# Patient Record
Sex: MALE | Race: WHITE | Employment: UNEMPLOYED | URBAN - METROPOLITAN AREA
[De-identification: names, ages, dates, MRNs, and addresses within clinical notes are randomized per-mention and may not be internally consistent; named-entity substitution may affect disease eponyms.]

---

## 2021-01-14 ENCOUNTER — HOSPITAL ENCOUNTER (EMERGENCY)
Age: 36
Discharge: HOME | End: 2021-01-15
Attending: STUDENT IN AN ORGANIZED HEALTH CARE EDUCATION/TRAINING PROGRAM
Payer: COMMERCIAL

## 2021-01-14 DIAGNOSIS — F29 PSYCHOSIS, UNSPECIFIED PSYCHOSIS TYPE (HCC): Primary | ICD-10-CM

## 2021-01-14 LAB
A/G RATIO: 1.5 (ref 1.1–2.2)
ACETAMINOPHEN LEVEL: <5 UG/ML (ref 10–30)
ALBUMIN SERPL-MCNC: 4.6 G/DL (ref 3.4–5)
ALP BLD-CCNC: 69 U/L (ref 40–129)
ALT SERPL-CCNC: 34 U/L (ref 10–40)
ANION GAP SERPL CALCULATED.3IONS-SCNC: 15 MMOL/L (ref 3–16)
AST SERPL-CCNC: 42 U/L (ref 15–37)
BASOPHILS ABSOLUTE: 0.1 K/UL (ref 0–0.2)
BASOPHILS RELATIVE PERCENT: 1 %
BILIRUB SERPL-MCNC: 1.1 MG/DL (ref 0–1)
BUN BLDV-MCNC: 37 MG/DL (ref 7–20)
CALCIUM SERPL-MCNC: 10.2 MG/DL (ref 8.3–10.6)
CHLORIDE BLD-SCNC: 101 MMOL/L (ref 99–110)
CO2: 21 MMOL/L (ref 21–32)
CREAT SERPL-MCNC: 0.9 MG/DL (ref 0.9–1.3)
EOSINOPHILS ABSOLUTE: 0.1 K/UL (ref 0–0.6)
EOSINOPHILS RELATIVE PERCENT: 0.5 %
ETHANOL: NORMAL MG/DL (ref 0–0.08)
GFR AFRICAN AMERICAN: >60
GFR NON-AFRICAN AMERICAN: >60
GLOBULIN: 3.1 G/DL
GLUCOSE BLD-MCNC: 140 MG/DL (ref 70–99)
HCT VFR BLD CALC: 45.8 % (ref 40.5–52.5)
HEMOGLOBIN: 15.7 G/DL (ref 13.5–17.5)
LYMPHOCYTES ABSOLUTE: 3.2 K/UL (ref 1–5.1)
LYMPHOCYTES RELATIVE PERCENT: 26.9 %
MCH RBC QN AUTO: 30.5 PG (ref 26–34)
MCHC RBC AUTO-ENTMCNC: 34.3 G/DL (ref 31–36)
MCV RBC AUTO: 88.8 FL (ref 80–100)
MONOCYTES ABSOLUTE: 1 K/UL (ref 0–1.3)
MONOCYTES RELATIVE PERCENT: 8.1 %
NEUTROPHILS ABSOLUTE: 7.5 K/UL (ref 1.7–7.7)
NEUTROPHILS RELATIVE PERCENT: 63.5 %
PDW BLD-RTO: 13.3 % (ref 12.4–15.4)
PLATELET # BLD: 238 K/UL (ref 135–450)
PMV BLD AUTO: 9.9 FL (ref 5–10.5)
POTASSIUM REFLEX MAGNESIUM: 4.1 MMOL/L (ref 3.5–5.1)
RBC # BLD: 5.16 M/UL (ref 4.2–5.9)
SALICYLATE, SERUM: <0.3 MG/DL (ref 15–30)
SARS-COV-2, NAAT: NOT DETECTED
SODIUM BLD-SCNC: 137 MMOL/L (ref 136–145)
TOTAL CK: 715 U/L (ref 39–308)
TOTAL PROTEIN: 7.7 G/DL (ref 6.4–8.2)
WBC # BLD: 11.9 K/UL (ref 4–11)

## 2021-01-14 PROCEDURE — G0480 DRUG TEST DEF 1-7 CLASSES: HCPCS

## 2021-01-14 PROCEDURE — 85025 COMPLETE CBC W/AUTO DIFF WBC: CPT

## 2021-01-14 PROCEDURE — 80053 COMPREHEN METABOLIC PANEL: CPT

## 2021-01-14 PROCEDURE — U0002 COVID-19 LAB TEST NON-CDC: HCPCS

## 2021-01-14 PROCEDURE — 82550 ASSAY OF CK (CPK): CPT

## 2021-01-14 PROCEDURE — 2500000003 HC RX 250 WO HCPCS: Performed by: EMERGENCY MEDICINE

## 2021-01-14 PROCEDURE — 99284 EMERGENCY DEPT VISIT MOD MDM: CPT

## 2021-01-14 PROCEDURE — 96372 THER/PROPH/DIAG INJ SC/IM: CPT

## 2021-01-14 RX ORDER — CHLORPROMAZINE HYDROCHLORIDE 25 MG/ML
50 INJECTION INTRAMUSCULAR ONCE
Status: COMPLETED | OUTPATIENT
Start: 2021-01-14 | End: 2021-01-14

## 2021-01-14 RX ADMIN — CHLORPROMAZINE HYDROCHLORIDE 50 MG: 25 INJECTION INTRAMUSCULAR at 22:21

## 2021-01-15 VITALS
OXYGEN SATURATION: 96 % | TEMPERATURE: 97 F | RESPIRATION RATE: 16 BRPM | HEART RATE: 112 BPM | BODY MASS INDEX: 22.4 KG/M2 | SYSTOLIC BLOOD PRESSURE: 105 MMHG | DIASTOLIC BLOOD PRESSURE: 65 MMHG | HEIGHT: 71 IN | WEIGHT: 160 LBS

## 2021-01-15 LAB
AMPHETAMINE SCREEN, URINE: POSITIVE
BARBITURATE SCREEN URINE: ABNORMAL
BENZODIAZEPINE SCREEN, URINE: ABNORMAL
BILIRUBIN URINE: NEGATIVE
BLOOD, URINE: NEGATIVE
CANNABINOID SCREEN URINE: POSITIVE
CLARITY: CLEAR
COCAINE METABOLITE SCREEN URINE: ABNORMAL
COLOR: YELLOW
GLUCOSE URINE: NEGATIVE MG/DL
KETONES, URINE: ABNORMAL MG/DL
LEUKOCYTE ESTERASE, URINE: NEGATIVE
Lab: ABNORMAL
METHADONE SCREEN, URINE: ABNORMAL
MICROSCOPIC EXAMINATION: ABNORMAL
NITRITE, URINE: NEGATIVE
OPIATE SCREEN URINE: ABNORMAL
OXYCODONE URINE: ABNORMAL
PH UA: 5.5
PH UA: 5.5 (ref 5–8)
PHENCYCLIDINE SCREEN URINE: ABNORMAL
PROPOXYPHENE SCREEN: ABNORMAL
PROTEIN UA: NEGATIVE MG/DL
SPECIFIC GRAVITY UA: >=1.03 (ref 1–1.03)
URINE TYPE: ABNORMAL
UROBILINOGEN, URINE: 0.2 E.U./DL

## 2021-01-15 PROCEDURE — 80307 DRUG TEST PRSMV CHEM ANLYZR: CPT

## 2021-01-15 PROCEDURE — 81003 URINALYSIS AUTO W/O SCOPE: CPT

## 2021-01-15 NOTE — ED PROVIDER NOTES
[address] ED      CHIEF COMPLAINT  Psychiatric Evaluation (Pt comes in from [facility] by police on statement of belief. PT was caught stealing from [employer] gas station , when police picked him up he starting making statements that he is sent from [employer] and he has all the answers. He told police that he may kill himself or other people but now says that police misunderstood. Pt was seen at [facility] last night for evaluation and discharged. )       [facility]  Zeny Acosta is a 28 y.o. male with a past medical history of cellulitis who presents to the ED for psychiatric evaluation. The police were called when the patient was caught stealing from a gas station. When police picked the patient up, he was making statements that he was sent from [employer] and had all the answers. He did endorse some suicidal ideation homicidal ideation to the police, but now states the police misunderstood. Patient was agitated on arrival, patient did receive Thorazine. Patient is unable to provide history. He was heard to smoke comments about potentially hurting other people. He does mention an area in his head. He is not able to focus and provide specific answers for questions. Patient has tangential speech with intermittent growling and grunting. He did state to nursing that he believes he is God. Suicidal ideation: denies  Previous attempts: denies  Homicidal ideation: unclear  Access to firearms: denies  Audiovisual hallucinations: Does appear the patient is responding to internal stimuli  Psychiatric medications: Unknown  Tobacco use: Denies  Alcohol use: Denies  Illicit drug use: Denies    Somatic complaints: Denies    No other complaints, modifying factors or associated symptoms. I have reviewed the following from the nursing documentation.     Past Medical History:   Diagnosis Date    MRSA (methicillin resistant staph aureus) culture positive 8/8/12    cellulitis leg History reviewed. No pertinent surgical history. Family History   Problem Relation Age of Onset    Arthritis Mother    [de-identified] / [place] Mother     Depression Sister     Mental Illness Paternal Uncle     High Blood Pressure Paternal Grandmother      Social History     Socioeconomic History    Marital status:      Spouse name: Not on file    Number of children: Not on file    Years of education: Not on file    Highest education level: Not on file   Occupational History    Not on file   Social Needs    Financial resource strain: Not on file    Food insecurity     Worry: Not on file     Inability: Not on file   [employer] needs     Medical: Not on file     Non-medical: Not on file   Tobacco Use    Smoking status: Current Every Day Smoker     Packs/day: 1.00     Types: Cigarettes    Smokeless tobacco: Never Used   Substance and Sexual Activity    Alcohol use: Not Currently     Comment: less than once a month    Drug use: No    Sexual activity: Yes     Partners: Female   Lifestyle    Physical activity     Days per week: Not on file     Minutes per session: Not on file    Stress: Not on file   Relationships    Social connections     Talks on phone: Not on file     Gets together: Not on file     Attends Church service: Not on file     Active member of club or organization: Not on file     Attends meetings of clubs or organizations: Not on file     Relationship status: Not on file    Intimate partner violence     Fear of current or ex partner: Not on file     Emotionally abused: Not on file     Physically abused: Not on file     Forced sexual activity: Not on file   Other Topics Concern    Not on file   Social History Narrative    Not on file     No current facility-administered medications for this encounter.       Current Outpatient Medications   Medication Sig Dispense Refill    naproxen (NAPROSYN) 500 MG tablet Take 1 tablet by mouth 2 times daily for 10 days 20 tablet 0    HYDROcodone-acetaminophen (NORCO) 5-325 MG per tablet Take 1 tablet by mouth every 4 hours as needed for Pain 15 tablet 0    ibuprofen (IBU) 800 MG tablet Take 1 tablet by mouth every 8 hours as needed for Pain. 20 tablet 0     No Known Allergies    REVIEW OF SYSTEMS  10 systems reviewed, pertinent positives per HPI otherwise noted to be negative. PHYSICAL EXAM  BP (!) 141/89   Pulse 104   Temp 97.8 °F (36.6 °C) (Axillary)   Resp 18   Ht 5' 11\" (1.803 m)   Wt 160 lb (72.6 kg)   SpO2 95%   BMI 22.32 kg/m²    GENERAL APPEARANCE: Awake and alert. Cooperative. no distress. HENT: Normocephalic. Atraumatic. Mucous membranes are moist  NECK: Supple. Full range of motion of the neck without stiffness or pain. EYES: PERRL. EOM's grossly intact. HEART/CHEST: RRR. No murmurs. LUNGS: Respirations unlabored. CTAB. Good air exchange. Speaking comfortably in full sentences. ABDOMEN: No tenderness. Soft. Non-distended. No masses. No organomegaly. No guarding or rebound. MUSCULOSKELETAL: No extremity edema. Compartments soft. No deformity. No tenderness in the extremities. All extremities neurovascularly intact. SKIN: Warm and dry. No acute rashes. NEUROLOGICAL: Alert and oriented. No gross facial drooping. Strength 5/5, sensation intact. PSYCHIATRIC: Does appear to be responding to internal stimuli. He is unable to directly answer questions about suicidal ideation or homicidal ideation. Does talk about an alien inside of his head. LABS  I have reviewed all labs for this visit.    Results for orders placed or performed during the hospital encounter of 01/14/21   CBC Auto Differential   Result Value Ref Range    WBC 11.9 (H) 4.0 - 11.0 K/uL    RBC 5.16 4.20 - 5.90 M/uL    Hemoglobin 15.7 13.5 - 17.5 g/dL    Hematocrit 45.8 40.5 - 52.5 %    MCV 88.8 80.0 - 100.0 fL    MCH 30.5 26.0 - 34.0 pg    MCHC 34.3 31.0 - 36.0 g/dL    RDW 13.3 12.4 - 15.4 %    Platelets 393 966 - 882 K/uL    MPV 9.9 5.0 - 10.5 fL    Neutrophils % 63.5 %    Lymphocytes % 26.9 %    Monocytes % 8.1 %    Eosinophils % 0.5 %    Basophils % 1.0 %    Neutrophils Absolute 7.5 1.7 - 7.7 K/uL    Lymphocytes Absolute 3.2 1.0 - 5.1 K/uL    Monocytes Absolute 1.0 0.0 - 1.3 K/uL    Eosinophils Absolute 0.1 0.0 - 0.6 K/uL    Basophils Absolute 0.1 0.0 - 0.2 K/uL   Comprehensive Metabolic Panel w/ Reflex to MG   Result Value Ref Range    Sodium 137 136 - 145 mmol/L    Potassium reflex Magnesium 4.1 3.5 - 5.1 mmol/L    Chloride 101 99 - 110 mmol/L    CO2 21 21 - 32 mmol/L    Anion Gap 15 3 - 16    Glucose 140 (H) 70 - 99 mg/dL    BUN 37 (H) 7 - 20 mg/dL    CREATININE 0.9 0.9 - 1.3 mg/dL    GFR Non-African American >60 >60    GFR African American >60 >60    Calcium 10.2 8.3 - 10.6 mg/dL    Total Protein 7.7 6.4 - 8.2 g/dL    Alb 4.6 3.4 - 5.0 g/dL    Albumin/Globulin Ratio 1.5 1.1 - 2.2    Total Bilirubin 1.1 (H) 0.0 - 1.0 mg/dL    Alkaline Phosphatase 69 40 - 129 U/L    ALT 34 10 - 40 U/L    AST 42 (H) 15 - 37 U/L    Globulin 3.1 g/dL   Acetaminophen level   Result Value Ref Range    Acetaminophen Level <5 (L) 10 - 30 ug/mL   Salicylate   Result Value Ref Range    Salicylate, Serum <3.8 (L) 15.0 - 30.0 mg/dL   Ethanol   Result Value Ref Range    Ethanol Lvl None Detected mg/dL   COVID-19   Result Value Ref Range    SARS-CoV-2, NAAT Not Detected Not Detected   CK   Result Value Ref Range    Total  (H) 39 - 308 U/L       During the patient's ED course, the patient was given:  Medications   chlorproMAZINE (THORAZINE) injection 50 mg (50 mg Intramuscular Given 1/14/21 2221)        ED COURSE/MDM  Patient seen and evaluated. Old records reviewed. Labs and imaging reviewed and results discussed with patient. Overall, patient in no acute distress, he appears to be in acute psychosis. He is tangential and difficult to redirect. Did receive Thorazine ordered by another provider upon presentation. He denies any somatic complaints.   Physical

## 2021-01-15 NOTE — ED NOTES
Patient awoken. VS obtained. Urine obtained and sent to lab.       Apolonia Cutler RN  01/15/21 5534

## 2021-01-15 NOTE — ED NOTES
Patient VS obtained. Patient awoken. Patient still appears somewhat high on drugs. Norbert Wesley attempting to evaluate patient.       Laron Peralta RN  01/15/21 4068

## 2021-01-15 NOTE — BH NOTE
Pt resting quietly in assigned treatment room, eyes closed, restless/frequently repositioning self. No signs of distress observed.

## 2021-01-15 NOTE — ED NOTES
Writer in to speak with patient and to obtain VS's. Patient cooperative with VS's, however, stated he was tired and wanted to sleep longer. He is irritable being awakened. Denies any current distress. Continues to be monitored for safety.      Teresita Mcclain RN  01/15/21 8879

## 2021-01-15 NOTE — BH NOTE
Patient continues to state that he is unwilling/unable to urinate to provide urine specimen. Is irritable due to being awakened to have VS taken. Patient is currently diaphoretic, is laying beneath 3 blankets. Denies any ill feeling or distress. Afebrile. Monitored for safety. Patient readily turned over and returned to resting state once VS's obtained.

## 2021-01-15 NOTE — ED NOTES
Patient continues to sleep. Will continue to monitor patient.       Apolonia Cutler RN  01/15/21 4777

## 2021-01-15 NOTE — BH NOTE
Patient is now resting quietly. Denies distress when asked if he is doing ok. Continues to be monitored for safety. Resps easy and even.

## 2021-01-15 NOTE — ED PROVIDER NOTES
I ordered medications for sedation, the patient was standing beside the bed, not redirectable verbally. He did require sedating medications for safety of himself and staff.      [facility] PHYSICAL [facility]ATION, [place]  01/14/21 0245

## 2021-01-15 NOTE — ED NOTES
Presenting Problem: pt was brought in by police on a SOB for a psychiatric evaluation. Appearance/Hygiene:  hospital attire   Motor Behavior: decreased   Attitude: cooperative at times but then during assessment pt reported that he was not answering any more questions and he had nothing else to talk about. Pt went back to sleep. Affect: anxiety, agitation  Speech: loud  Mood: anxious, irritable  Thought Processes: tangential speech  Perceptions: URIEL. Pt stopped answering questions  Thought content:  Illogical at times  Suicidal ideation:  no specific plan to harm self : pt denies being suicidal  Homicidal ideation: pt denies  Orientation: A&Ox4   Memory: impaired   Concentration: Poor    Insight/ judgement: poor insight and poor judgemment      Psychosocial and contextual factors: pt reported that he is homeless and he lives in [address] [place] [address]. C-SSRS Summary (including current and past suicidal ideation, plan, intent, and attempts) : pt is denying current SI and denied making suicidal statements when he was brought in.     Psychiatric History: pt denies    Patient reported diagnosis: none    Outpatient services/ Provider: none    Previous Inpatient Admissions( including location and dates if known): none    Self-injurious/ Self-harm behavior: URIEL    History of violence: URIEL    Current Substance use: pt's drug screen positive for amphetamines and marujuana    Trauma identified: URIEL    Access to Firearms: URIEL    ASSESSMENT FOR IMMINENT FUTURE DANGER:      RISK FACTORS:    []  Age <25 or >49   []  Male gender   []  Depressed mood   []  Active suicidal ideation   []  Suicide plan   []  Suicide attempt   []  Access to lethal means   []  Prior suicide attempt   []  Active substance abuse   []  Highly impulsive behaviors   []  Not attending to self-care/ADLs    []  Recent significant loss   []  Chronic pain or medical illness   []  Social isolation   []  History of violence   []  Active psychosis   []  Cognitive impairment    []  No outpatient services in place   []  Medication noncompliance   []  No collateral information to support safety   []      PROTECTIVE FACTORS:  [] Age >25 and <55  [] Female gender   [] Denies depression  [] Denies suicidal ideation  [] Does not have lethal plan   [] Does not have access to guns or weapons  [] Patient is verbally erwin for safety  [] No prior suicide attempts  [] No active substance abuse  [] Patient has social or family support  [] No active psychosis or cognitive dysfunction  [] Physically healthy  [] Has outpatient services in place  [] Compliant with recommended medications  [] Collateral information from  supports patient safety   [] Patient is future oriented with plans to   []        Clinical Summary:    Patient presents to [facility] on a SOB. Patient was clinically sober at the time of the evaluation. Patient was evaluated and offered supportive counseling. Patient was given thorazine due to agitation. Per SOB pt advised that he needs help that he will kill himself or someone else and that he will break into someones home. The male was talking in 3rd person to himself during transport. He told himself that he needs to stop being a bitch and kill himself and he said that if he doesn't then he will do something about it.  attempted to meet with pt for evaluation. Pt reported that he stole candy bar from a gas station. He denied being suicidal. Pt having difficulty answering questions and speech was tangential. Pt reported \"awhile \" since last used meth. He reported that he really doesn't like to use it but it makes him feel better. Pt had a difficult time focusing. He reported being tired and reported that he was not answering anymore questions. Pt reported he had nothing else to talk about and went back to sleep.            BRENDA Gutiérrez  01/15/21 3570

## 2021-01-15 NOTE — ED NOTES
Patient continues to sleep. Patient evaluated by Sony Tejada however continued to be somewhat drowsy and appear high. Patient to be reevaluated. Will continue to monitor patient.       Nida Jacobs RN  01/15/21 9867

## 2021-01-15 NOTE — ED NOTES
Reviewed clinical information with Dr. Eboni Chow. Per Dr. Eboni Chow continue to let pt sleep and then reevaluate pt after he wakes up.      BRENDA Dee  01/15/21 2731

## 2021-01-15 NOTE — ED NOTES
Continues to rest peacefully with eyes closed, no outward s/s distress noted. Monitored for safety.      Lisa Andrea RN  01/15/21 0126

## 2021-01-15 NOTE — ED NOTES
Continues to rest with eyes closed, no outward s/s distress noted. Monitored for safety.      Mili Bronson RN  01/15/21 0946

## 2021-01-16 NOTE — ED NOTES
Patient at the unit window wanting to be discharged from the hospital. Patient currently denies any SI/HI, A/V hallucinations. Patient is able to carry on a conversation. He relates that he was brought down here because he stole a [employer] candy and ate one then tried to get another one  And leave the store. He said that he told on himself about the candy. He denies any access to firearms, he denies any history of violence. He is alert and oriented x 4.  Will update Dr. Edgar Anderson on patient condition and request to be discharged from the hospital.     Winifred Raya RN  01/15/21 1955

## 2021-01-16 NOTE — ED NOTES
Patient provided discharge instructions. No questions asked regarding discharge. Patient signed discharge paperwork. Patient discharged to the ED waiting room to get a ride from [employer] cab services.      Elliot Tuttle RN  01/15/21 1341

## 2021-01-16 NOTE — ED NOTES
...  Level of Care Disposition: To be discharged      Patient was seen by ED provider and [facility] staff. The case was presented to psychiatric provider on-call Dr. Rosalina Maurice. Based on the ED evaluation and information presented to the provider by Pasquale the decision was made to discharge patient with the following referrals: substance abuse treatment in [facility]. RATIONALE FOR NON-ADMISSION:  The patient does not meet criteria for an involuntary psychiatric admission because patient currently denies SI/HI,A/V hallucinations. He is alert and oriented x 4 at this time.  He is asking to be discharged from the hospital.                 Debo Aden, RN  01/15/21 2002

## 2021-02-18 ENCOUNTER — HOSPITAL ENCOUNTER (EMERGENCY)
Age: 36
Discharge: HOME | End: 2021-02-19
Attending: EMERGENCY MEDICINE
Payer: COMMERCIAL

## 2021-02-18 DIAGNOSIS — F15.959 METHAMPHETAMINE-INDUCED PSYCHOTIC DISORDER (HCC): Primary | ICD-10-CM

## 2021-02-18 DIAGNOSIS — Z00.8 ENCOUNTER FOR PSYCHOLOGICAL EVALUATION: ICD-10-CM

## 2021-02-18 LAB
A/G RATIO: 1.5 (ref 1.1–2.2)
ACETAMINOPHEN LEVEL: <5 UG/ML (ref 10–30)
ALBUMIN SERPL-MCNC: 4.8 G/DL (ref 3.4–5)
ALP BLD-CCNC: 64 U/L (ref 40–129)
ALT SERPL-CCNC: 25 U/L (ref 10–40)
AMPHETAMINE SCREEN, URINE: POSITIVE
ANION GAP SERPL CALCULATED.3IONS-SCNC: 6 MMOL/L (ref 3–16)
AST SERPL-CCNC: 22 U/L (ref 15–37)
BARBITURATE SCREEN URINE: ABNORMAL
BASOPHILS ABSOLUTE: 0 K/UL (ref 0–0.2)
BASOPHILS RELATIVE PERCENT: 0.5 %
BENZODIAZEPINE SCREEN, URINE: ABNORMAL
BILIRUB SERPL-MCNC: 0.4 MG/DL (ref 0–1)
BUN BLDV-MCNC: 12 MG/DL (ref 7–20)
CALCIUM SERPL-MCNC: 9.6 MG/DL (ref 8.3–10.6)
CANNABINOID SCREEN URINE: ABNORMAL
CHLORIDE BLD-SCNC: 98 MMOL/L (ref 99–110)
CO2: 33 MMOL/L (ref 21–32)
COCAINE METABOLITE SCREEN URINE: ABNORMAL
CREAT SERPL-MCNC: 0.7 MG/DL (ref 0.9–1.3)
EOSINOPHILS ABSOLUTE: 0 K/UL (ref 0–0.6)
EOSINOPHILS RELATIVE PERCENT: 0.2 %
ETHANOL: NORMAL MG/DL (ref 0–0.08)
GFR AFRICAN AMERICAN: >60
GFR NON-AFRICAN AMERICAN: >60
GLOBULIN: 3.1 G/DL
GLUCOSE BLD-MCNC: 121 MG/DL (ref 70–99)
HCT VFR BLD CALC: 44 % (ref 40.5–52.5)
HEMOGLOBIN: 15.1 G/DL (ref 13.5–17.5)
LYMPHOCYTES ABSOLUTE: 1.6 K/UL (ref 1–5.1)
LYMPHOCYTES RELATIVE PERCENT: 17.5 %
Lab: ABNORMAL
MCH RBC QN AUTO: 30.7 PG (ref 26–34)
MCHC RBC AUTO-ENTMCNC: 34.5 G/DL (ref 31–36)
MCV RBC AUTO: 89 FL (ref 80–100)
METHADONE SCREEN, URINE: ABNORMAL
MONOCYTES ABSOLUTE: 0.6 K/UL (ref 0–1.3)
MONOCYTES RELATIVE PERCENT: 6.6 %
NEUTROPHILS ABSOLUTE: 7 K/UL (ref 1.7–7.7)
NEUTROPHILS RELATIVE PERCENT: 75.2 %
OPIATE SCREEN URINE: ABNORMAL
OXYCODONE URINE: ABNORMAL
PDW BLD-RTO: 13.7 % (ref 12.4–15.4)
PH UA: 7
PHENCYCLIDINE SCREEN URINE: ABNORMAL
PLATELET # BLD: 216 K/UL (ref 135–450)
PMV BLD AUTO: 8.9 FL (ref 5–10.5)
POTASSIUM REFLEX MAGNESIUM: 3.8 MMOL/L (ref 3.5–5.1)
PROPOXYPHENE SCREEN: ABNORMAL
RBC # BLD: 4.94 M/UL (ref 4.2–5.9)
SALICYLATE, SERUM: <0.3 MG/DL (ref 15–30)
SARS-COV-2, NAAT: NOT DETECTED
SODIUM BLD-SCNC: 137 MMOL/L (ref 136–145)
TOTAL PROTEIN: 7.9 G/DL (ref 6.4–8.2)
WBC # BLD: 9.3 K/UL (ref 4–11)

## 2021-02-18 PROCEDURE — 99285 EMERGENCY DEPT VISIT HI MDM: CPT

## 2021-02-18 PROCEDURE — 80143 DRUG ASSAY ACETAMINOPHEN: CPT

## 2021-02-18 PROCEDURE — 80053 COMPREHEN METABOLIC PANEL: CPT

## 2021-02-18 PROCEDURE — 80179 DRUG ASSAY SALICYLATE: CPT

## 2021-02-18 PROCEDURE — 85025 COMPLETE CBC W/AUTO DIFF WBC: CPT

## 2021-02-18 PROCEDURE — 82077 ASSAY SPEC XCP UR&BREATH IA: CPT

## 2021-02-18 PROCEDURE — 80307 DRUG TEST PRSMV CHEM ANLYZR: CPT

## 2021-02-18 PROCEDURE — 6370000000 HC RX 637 (ALT 250 FOR IP): Performed by: EMERGENCY MEDICINE

## 2021-02-18 RX ORDER — OLANZAPINE 5 MG/1
10 TABLET, ORALLY DISINTEGRATING ORAL ONCE
Status: COMPLETED | OUTPATIENT
Start: 2021-02-18 | End: 2021-02-18

## 2021-02-18 RX ADMIN — OLANZAPINE 10 MG: 5 TABLET, ORALLY DISINTEGRATING ORAL at 19:42

## 2021-02-18 NOTE — ED TRIAGE NOTES
Pt states that he is [place] and all the important people in the bible. He denies feeling harmful to himself or SI. He has disorganized thoughts and says he needs sex a lot. . Always has and always will.  [facility] PD brought pt to ED>

## 2021-02-18 NOTE — ED PROVIDER NOTES
[address] ED    CHIEF COMPLAINT  Psychiatric Evaluation (Brought in by [facility] on a Statement of Belief signed by psychiatrist, DR Edwin Encinas for disroganized thoughts, Episcopalian delusions, and decompensation from baseline level of functioning. )       [facility]  Angella Cedeno is a 28 y.o. male who presents to the ED with concern for psychosis. Presents by [facility] with Statement of Belief signed by psychiatrist for concern for disorganized thoughts, and delusions related to Episcopalian themes. The patient denies suicidal ideation. Denies homicidal ideation. Admits to methamphetamine use in past several days. Denies ntentional self harm/ingestions today. Denies chest pain, SOB, nausea, vomiting, diarrhea. No other complaints, modifying factors or associated symptoms. I have reviewed the following from the nursing documentation:    Past Medical History:   Diagnosis Date    Depression     MRSA (methicillin resistant staph aureus) culture positive 8/8/12    cellulitis leg     History reviewed. No pertinent surgical history.   Family History   Problem Relation Age of Onset    Arthritis Mother    [de-identified] / [place] Mother     Depression Sister     Mental Illness Paternal Uncle     High Blood Pressure Paternal Grandmother      Social History     Socioeconomic History    Marital status:      Spouse name: Not on file    Number of children: Not on file    Years of education: Not on file    Highest education level: Not on file   Occupational History    Not on file   Social Needs    Financial resource strain: Not on file    Food insecurity     Worry: Not on file     Inability: Not on file   [employer] needs     Medical: Not on file     Non-medical: Not on file   Tobacco Use    Smoking status: Former Smoker     Types: Cigarettes    Smokeless tobacco: Never Used    Tobacco comment: handouts   Substance and Sexual Activity    Alcohol use: Yes     Comment: less than once a month    Drug use: Yes     Types: Methamphetamines     Comment: daily use    Sexual activity: Yes     Partners: Female   Lifestyle    Physical activity     Days per week: Not on file     Minutes per session: Not on file    Stress: Not on file   Relationships    Social connections     Talks on phone: Not on file     Gets together: Not on file     Attends Amish service: Not on file     Active member of club or organization: Not on file     Attends meetings of clubs or organizations: Not on file     Relationship status: Not on file    Intimate partner violence     Fear of current or ex partner: Not on file     Emotionally abused: Not on file     Physically abused: Not on file     Forced sexual activity: Not on file   Other Topics Concern    Not on file   Social History Narrative    Not on file     No current facility-administered medications for this encounter. No current outpatient medications on file. No Known Allergies    REVIEW OF SYSTEMS  10 systems reviewed, pertinent positives and negatives per HPI, otherwise noted to be negative. PHYSICAL EXAM  ED Triage Vitals [02/18/21 1734]   Enc Vitals Group      BP (!) 159/82      Pulse 98      Resp 16      Temp 97 °F (36.1 °C)      Temp src       SpO2 99 %      Weight 180 lb (81.6 kg)      Height 6' (1.829 m)      Head Circumference       Peak Flow       Pain Score       Pain Loc       Pain Edu? Excl. in [address]? General appearance: Awake and alert. Cooperative. Appears anxious. HENT: Normocephalic. Atraumatic. Mucous membranes are moist.  Neck: Supple. Eyes: PERRL. EOMI. Heart/Chest: RRR. No murmurs. Lungs: Respirations unlabored. CTAB. Good air exchange. Speaking comfortably in full sentences. Abdomen: Soft. Non-tender. Non-distended. No rebound or guarding. Musculoskeletal: No extremity edema. No deformity. No tenderness in the extremities. All extremities neurovascularly intact.   Skin: Warm and dry. No acute rashes. Neurological: Alert and oriented. CN II-XII intact. Strength 5/5 bilateral upper and lower extremities. Sensation intact to light touch. Gait normal.  Psychiatric: Disorganized, tangential, redirectable. LABS  I have reviewed all labs for this visit.    Results for orders placed or performed during the hospital encounter of 02/18/21   COVID-19, Rapid   Result Value Ref Range    SARS-CoV-2, NAAT Not Detected Not Detected   CBC Auto Differential   Result Value Ref Range    WBC 9.3 4.0 - 11.0 K/uL    RBC 4.94 4.20 - 5.90 M/uL    Hemoglobin 15.1 13.5 - 17.5 g/dL    Hematocrit 44.0 40.5 - 52.5 %    MCV 89.0 80.0 - 100.0 fL    MCH 30.7 26.0 - 34.0 pg    MCHC 34.5 31.0 - 36.0 g/dL    RDW 13.7 12.4 - 15.4 %    Platelets 854 175 - 669 K/uL    MPV 8.9 5.0 - 10.5 fL    Neutrophils % 75.2 %    Lymphocytes % 17.5 %    Monocytes % 6.6 %    Eosinophils % 0.2 %    Basophils % 0.5 %    Neutrophils Absolute 7.0 1.7 - 7.7 K/uL    Lymphocytes Absolute 1.6 1.0 - 5.1 K/uL    Monocytes Absolute 0.6 0.0 - 1.3 K/uL    Eosinophils Absolute 0.0 0.0 - 0.6 K/uL    Basophils Absolute 0.0 0.0 - 0.2 K/uL   Comprehensive Metabolic Panel w/ Reflex to MG   Result Value Ref Range    Sodium 137 136 - 145 mmol/L    Potassium reflex Magnesium 3.8 3.5 - 5.1 mmol/L    Chloride 98 (L) 99 - 110 mmol/L    CO2 33 (H) 21 - 32 mmol/L    Anion Gap 6 3 - 16    Glucose 121 (H) 70 - 99 mg/dL    BUN 12 7 - 20 mg/dL    CREATININE 0.7 (L) 0.9 - 1.3 mg/dL    GFR Non-African American >60 >60    GFR African American >60 >60    Calcium 9.6 8.3 - 10.6 mg/dL    Total Protein 7.9 6.4 - 8.2 g/dL    Albumin 4.8 3.4 - 5.0 g/dL    Albumin/Globulin Ratio 1.5 1.1 - 2.2    Total Bilirubin 0.4 0.0 - 1.0 mg/dL    Alkaline Phosphatase 64 40 - 129 U/L    ALT 25 10 - 40 U/L    AST 22 15 - 37 U/L    Globulin 3.1 g/dL   Ethanol   Result Value Ref Range    Ethanol Lvl None Detected mg/dL   DRUG SCREEN MULTI URINE   Result Value Ref Range    Amphetamine Screen, Urine POSITIVE (A) Negative <1000ng/mL    Barbiturate Screen, Ur Neg Negative <200 ng/mL    Benzodiazepine Screen, Urine Neg Negative <200 ng/mL    Cannabinoid Scrn, Ur Neg Negative <50 ng/mL    Cocaine Metabolite Screen, Urine Neg Negative <300 ng/mL    Opiate Scrn, Ur Neg Negative <300 ng/mL    PCP Screen, Urine Neg Negative <25 ng/mL    Methadone Screen, Urine Neg Negative <300 ng/mL    Propoxyphene Scrn, Ur Neg Negative <300 ng/mL    Oxycodone Urine Neg Negative <100 ng/ml    pH, UA 7.0     Drug Screen Comment: see below    Acetaminophen (TYLENOL) level   Result Value Ref Range    Acetaminophen Level <5 (L) 10 - 30 ug/mL   Salicylate   Result Value Ref Range    Salicylate, Serum <1.7 (L) 15.0 - 30.0 mg/dL       RADIOLOGY  I have reviewed all radiographic studies for this visit. No orders to display        ED COURSE/MDM  Patient seen and evaluated. Old records reviewed. Labs and imaging reviewed and results discussed with patient/family to extent possible.       28 y.o. male presents with disorganized thoughts and delusions in the setting of methamphetamine abuse. . Vitals notable for mild hypertension, otherwise unremarkable. No gross evidence of self harm. Plan is usual screening psychiatric laboratory studies. If reassuring, anticipate medical clearance with final disposition per [facility]. UDS positive for amphetamine. Remainder of psychiatric screening laboratory studies are otherwise reassuring. I have performed a medical clearance examination on this patient. It is my opinion that no medical conditions were discovered that would preclude admission to a behavioral health unit or discharge home. I feel that the patient is medically stable for disposition by the behavioral health team at this time.       During the patient's ED course, the patient was given:  Medications   OLANZapine zydis (ZYPREXA) disintegrating tablet 10 mg (10 mg Oral Given 2/18/21 1942)        All questions were answered and the patient/family expressed understanding and agreement with the plan. PROCEDURES  None    CRITICAL CARE  N/A    CLINICAL IMPRESSION  1. Methamphetamine-induced psychotic disorder ([facility] [address]        DISPOSITION   Pending per [facility]    Condition: stable    Raylene Ormond, MD    Note: This chart was created using voice recognition dictation software. Efforts were made by me to ensure accuracy, however some errors may be present due to limitations of this technology and occasionally words are not transcribed correctly.        Raylene Ormond, MD  02/18/21 9833

## 2021-02-19 VITALS
TEMPERATURE: 96.4 F | SYSTOLIC BLOOD PRESSURE: 126 MMHG | OXYGEN SATURATION: 97 % | DIASTOLIC BLOOD PRESSURE: 80 MMHG | RESPIRATION RATE: 16 BRPM | HEART RATE: 93 BPM | HEIGHT: 72 IN | WEIGHT: 180 LBS | BODY MASS INDEX: 24.38 KG/M2

## 2021-02-19 NOTE — BH NOTE
Presenting Problem:SA    Appearance/Hygiene:  well-appearing, hospital attire, lying in bed, good grooming and good hygiene   Motor Behavior: WNL   Attitude: distracted  Affect: normal affect   Speech: increased latency of response  Mood: within normal limits   Thought Processes: Illogical.  States that he knows things aren't working out right at this time, however, he is unwilling/unable to invoke any positive changes into his life. He does not equate the things that are going wrong in his life and his homelessness as related to his use of methamphetamines. Patient believes that the methamphetamines, \"help to calm\" him. Perceptions: Absent   Thought content: States that he knows he has been having abnormal thoughts and can see that more clearly this morning after having slept throughout the night. Patient intermittently will make random statements that are off topic or unrelated to what is currently being discussed. Suicidal ideation:  no specific plan to harm self   Homicidal ideation:  none  Orientation: A&Ox3  Disoriented to situation. Does not correlate SA as a reason for current life situation. Memory: impaired recent memory, remote. States he has 3 children, however, had great difficulty stating where they live and how old they are. Concentration: Fair    Insight/ judgement: impaired judgment, impaired insight, at this time patient made 1 statement in regards to Restoration recognition by stating that he was Jehovah. Delsa Severance Psychosocial and contextual factors: Patient states that he was able to return to his mother's home following discharge from [facility]. Patient unwilling/unable to state date that he was discharged. Per patient, he was cabbed home from the [facility]. Patient states he has 3 children. He is unwilling/unable to state ages. Believes, \"1 lives in [place] and the other 2 live in [address] [place] [address]. \"  He is unwilling/unable to state when he last saw his children.   States relationship with mother is, Lidia Arias" and states his mother does not care for self and states that she sometimes uses drugs, as well. States F    C-SSRS Summary (including current and past suicidal ideation, plan, intent, and attempts) : Denies    Psychiatric History: States he was recently at [facility] for psychiatric care  MDD  Patient reported diagnosis \"I have abnormal thinking\"    Outpatient services/ Provider: None    Previous Inpatient Admissions( including location and dates if known): States he was, \"at a place in [place]\" ([facility]) but is unwilling/unable to state when he went, how long he was there or diagnosis. Denies any other mental health inpatient stays. Self-injurious/ Self-harm behavior: Denies    History of violence: Denies    Current Substance use: Methamphetamine; Marijuana    Trauma identified: Denies    Access to Firearms: Denies    ASSESSMENT FOR IMMINENT FUTURE DANGER:      RISK FACTORS:    []  Age <25 or >49   [x]  Male gender   []  Depressed mood   []  Active suicidal ideation   []  Suicide plan   []  Suicide attempt   []  Access to lethal means   []  Prior suicide attempt   [x]  Active substance abuse   []  Highly impulsive behaviors   []  Not attending to self-care/ADLs    []  Recent significant loss   []  Chronic pain or medical illness   []  Social isolation   []  History of violence   []  Active psychosis   []  Cognitive impairment    [x]  No outpatient services in place   [x]  Medication noncompliance:  States, \"I don't believe pills are the answer. They are chemicals that mess with your system. \"   []  No collateral information to support safety     PROTECTIVE FACTORS:  [x] Age >25 and <55  [] Female gender   [x] Denies depression  [x] Denies suicidal ideation  [x] Does not have lethal plan   [x] Does not have access to guns or weapons  [x] Patient is verbally erwin for safety  [x] No prior suicide attempts  [] No active substance abuse  [] Patient has social or family support  [x] No active psychosis or cognitive dysfunction  [x] Physically healthy  [] Has outpatient services in place  [] Compliant with recommended medications    Clinical Summary:    Patient presents to the [facility] on a psychiatrist's SOB after psychiatrist assessed patient at the [facility] due to disorganized thoughts and actions. Patient was clinically sober at the time of the evaluation. Patient was evaluated and offered supportive counseling.

## 2021-02-19 NOTE — ED NOTES
Vitals obtained. Pt calm and cooperative. Pt asking for food. Writer provided pt with boxed lunch. Will continue to monitor for safety.      Karla Santa MSW,LSW

## 2021-02-19 NOTE — BH NOTE
Level of Care Disposition: Discharge    Patient was seen by ED provider and [facility] staff. The case was presented to psychiatric provider on-call, Dr. Dominique Cantrell. Based on the ED evaluation and information presented to the provider by writer, the decision was made to discharge patient with the following referrals: [address]; outpatient resources. RATIONALE FOR NON-ADMISSION:  The patient does not meet criteria for an involuntary psychiatric admission because he is not suicidal, homicidal nor psychotic at this time and is not an imminent threat to self or others. Patient has demonstrated a reasonable safety plan to go to his mother's home following discharge.

## 2021-02-19 NOTE — BH NOTE
Patient remains resting with eyes closed. Resps unlabored. No outward s/s distress noted. Monitored for safety. The patient has been examined and the H&P has been reviewed:    I concur with the findings and no changes have occurred since H&P was written.    Anesthesia/Surgery risks, benefits and alternative options discussed and understood by patient/family.          Active Hospital Problems    Diagnosis  POA    Screening for colon cancer [Z12.11]  Not Applicable      Resolved Hospital Problems   No resolved problems to display.

## 2021-02-19 NOTE — BH NOTE
Continues to rest in assigned room. No outward s/s distress noted at this time. Continues to be monitored for safety.

## 2021-02-19 NOTE — ED NOTES
Pt was brought in to the ED by [facility] on a SOB signed by psychiatrist, Dr Dorian Del Toro. Per SOB, pt's thoughts were disorganized, difficult to follow, and consisted of Caodaism delusions. Pt initially able to hold an appropriate conversation with linear and organized thoughts, however, as the conversation progressed his thoughts began to derail. He admits to using methamphetamines off and on since age 15 with his last use being today. Pt stated he does not feel his meth use could be the cause of his disorganized thoughts. \" Meth helps cure me. It changes my DNA\". Pt exhibits delusions of grandeur with an elevated sense of self. \" I am God, Paulo, Arc Yady Juniper, and Jehovah. I have recreated everything on my own and can heal people with my mind. I have three children but really I have an entire world of kids since I am Patric, the creator of all people\". Denies suicidal or homicidal ideations. Pt is cooperative with assessment and [facility] rules. Unable to obtain an accurate history at this time given symptom severity. Per Epic, pt was evaluated at [place] [id number] on 2/4/2021 and again on 2/5/2021. Initial encounter appears to be consistent with his current presentation. Second encounter he complained of homelessness and anxiety. Per pt, he was transferred to an inpatient psychiatric facility in [place] after the second visit. \" I got myself committed because it was cold outside and I didn't have anywhere to go\".       Yoly Zimmer RN  02/18/21 1946

## 2021-02-19 NOTE — ED NOTES
Pt resting in room, talking with [facility] nurse, no signs of distress noted. Will continue to monitor for safety.      Vinny Carrizales MSW,LSW

## 2021-02-19 NOTE — ED NOTES
Pt resting in room, no signs of distress noted. Will continue to monitor for safety.      Raudel Melendez MSW,LSW

## 2021-02-19 NOTE — ED NOTES
Pt resting in room, no signs of distress noted. Will continue to monitor for safety.      Juan CONTRERAS,MANUELW

## 2021-02-19 NOTE — ED NOTES
Pt resting in room, no signs of distress noted. Will continue to monitor for safety.      Jaden Benavidez MSW,LSW

## 2021-06-17 NOTE — BH NOTE
Resting with eyes closed, no outward s/s distress noted. Monitored for safety. The patient is Stable - Low risk of patient condition declining or worsening    Shift Goals  Clinical Goals: remain free from falls, discharge planning   Patient Goals: rest comfortably   Family Goals: na    Progress made toward(s) clinical / shift goals:  pt calls for assistance    Patient is not progressing towards the following goals: